# Patient Record
Sex: FEMALE | Race: WHITE | Employment: FULL TIME | URBAN - METROPOLITAN AREA
[De-identification: names, ages, dates, MRNs, and addresses within clinical notes are randomized per-mention and may not be internally consistent; named-entity substitution may affect disease eponyms.]

---

## 2017-02-01 PROCEDURE — 87624 HPV HI-RISK TYP POOLED RSLT: CPT | Performed by: INTERNAL MEDICINE

## 2017-02-01 PROCEDURE — 88175 CYTOPATH C/V AUTO FLUID REDO: CPT | Performed by: INTERNAL MEDICINE

## 2017-06-22 PROBLEM — M22.41 CHONDROMALACIA OF PATELLA, RIGHT: Status: ACTIVE | Noted: 2017-06-22

## 2025-01-14 ENCOUNTER — TELEPHONE (OUTPATIENT)
Dept: UROLOGY | Facility: CLINIC | Age: 55
End: 2025-01-14

## 2025-01-21 ENCOUNTER — OFFICE VISIT (OUTPATIENT)
Dept: UROLOGY | Facility: CLINIC | Age: 55
End: 2025-01-21
Attending: OBSTETRICS & GYNECOLOGY
Payer: COMMERCIAL

## 2025-01-21 VITALS
DIASTOLIC BLOOD PRESSURE: 80 MMHG | HEIGHT: 63 IN | BODY MASS INDEX: 29.23 KG/M2 | WEIGHT: 165 LBS | SYSTOLIC BLOOD PRESSURE: 120 MMHG

## 2025-01-21 DIAGNOSIS — N81.11 CYSTOCELE, MIDLINE: ICD-10-CM

## 2025-01-21 DIAGNOSIS — N81.6 RECTOCELE: ICD-10-CM

## 2025-01-21 DIAGNOSIS — N95.2 VAGINAL ATROPHY: ICD-10-CM

## 2025-01-21 DIAGNOSIS — N81.84 PELVIC MUSCLE WASTING: ICD-10-CM

## 2025-01-21 DIAGNOSIS — N39.41 URGE INCONTINENCE: ICD-10-CM

## 2025-01-21 DIAGNOSIS — N39.3 FEMALE STRESS INCONTINENCE: Primary | ICD-10-CM

## 2025-01-21 LAB
BILIRUB UR QL STRIP.AUTO: NEGATIVE
CLARITY UR REFRACT.AUTO: CLEAR
CONTROL RUN WITHIN 24 HOURS?: YES
GLUCOSE UR STRIP.AUTO-MCNC: NORMAL MG/DL
HYALINE CASTS #/AREA URNS AUTO: PRESENT /LPF
KETONES UR STRIP.AUTO-MCNC: NEGATIVE MG/DL
LEUKOCYTE ESTERASE UR QL STRIP.AUTO: NEGATIVE
LEUKOCYTE ESTERASE URINE: NEGATIVE
NITRITE URINE: NEGATIVE
PH UR STRIP.AUTO: 5.5 [PH] (ref 5–8)
PROT UR STRIP.AUTO-MCNC: NEGATIVE MG/DL
RBC UR QL AUTO: NEGATIVE
SP GR UR STRIP.AUTO: 1.03 (ref 1–1.03)
UROBILINOGEN UR STRIP.AUTO-MCNC: NORMAL MG/DL

## 2025-01-21 PROCEDURE — 99212 OFFICE O/P EST SF 10 MIN: CPT

## 2025-01-21 PROCEDURE — 87186 SC STD MICRODIL/AGAR DIL: CPT | Performed by: OBSTETRICS & GYNECOLOGY

## 2025-01-21 PROCEDURE — 87088 URINE BACTERIA CULTURE: CPT | Performed by: OBSTETRICS & GYNECOLOGY

## 2025-01-21 PROCEDURE — 87086 URINE CULTURE/COLONY COUNT: CPT | Performed by: OBSTETRICS & GYNECOLOGY

## 2025-01-21 PROCEDURE — 57160 INSERT PESSARY/OTHER DEVICE: CPT | Performed by: OBSTETRICS & GYNECOLOGY

## 2025-01-21 PROCEDURE — 81002 URINALYSIS NONAUTO W/O SCOPE: CPT | Performed by: OBSTETRICS & GYNECOLOGY

## 2025-01-21 PROCEDURE — 81001 URINALYSIS AUTO W/SCOPE: CPT | Performed by: OBSTETRICS & GYNECOLOGY

## 2025-01-21 RX ORDER — ESTRADIOL 0.1 MG/G
CREAM VAGINAL
Qty: 42 G | Refills: 3 | Status: SHIPPED | OUTPATIENT
Start: 2025-01-21

## 2025-01-21 RX ORDER — TOPIRAMATE 25 MG/1
25 TABLET, FILM COATED ORAL 2 TIMES DAILY
COMMUNITY

## 2025-01-21 RX ORDER — SEGESTERONE ACETATE AND ETHINYL ESTRADIOL 103; 17.4 MG/1; MG/1
RING VAGINAL
COMMUNITY

## 2025-01-21 NOTE — PROGRESS NOTES
Isabel Vu,   2025     Referred by self  Pt here with self    Chief Complaint   Patient presents with    Prolapse     Self-Referred       HPI:  +KATHERIN  +UUI  Nocturia x0  Denies sense of incomplete bladder emptying  +prolapse  Ring hormones, no cycles  No dyspareunia  Reg bowels    PRIOR TREATMENTS:    Kegels    no UTIs (3/23 & )  No gross hematuria    , vdx1, c/sx1    Reports h/o pyelo    Vitals:  /80   Ht 63\"   Wt 165 lb (74.8 kg)   BMI 29.23 kg/m²      HISTORY:  Past Medical History:    Chondromalacia of patella, right    Echo  - nl    Family history of colon cancer - mOTHER    Family history of early CAD - father, brother - MI    HYPERLIPIDEMIA    PIH (pregnancy induced hypertension)    Pyelonephritis      Past Surgical History:   Procedure Laterality Date          Colonoscopy  2016    internal hemorrhoids, repeat     Colonoscopy  2021    int hem, rpt     Colonoscopy,diagnostic  6/10/2011    HÉCTOR Balderas. Hemorrhoids, Repeat .     Colonoscopy,diagnostic N/A 2016    Procedure: COLONOSCOPY, POSSIBLE BIOPSY, POSSIBLE POLYPECTOMY 71195;  Surgeon: Jessenia Neri MD;  Location: [facility], [facility]    Other surgical history      Cervical lymph node excision      Family History   Problem Relation Age of Onset    Heart Disorder Father         MI age 48    Other (Other) Father     Cancer Mother         Rectal    Heart Disorder Brother         MI age 38    Cancer Brother         Bladder    Other (Other) Brother     Heart Disease Brother     Breast Cancer Maternal Grandmother 60        Age at dx 60    Breast Cancer Maternal Aunt 60        Age at dx 60    Cancer Brother         Bladder    Osteoporosis Sister       Social History     Socioeconomic History    Marital status:    Tobacco Use    Smoking status: Never    Smokeless tobacco: Never   Vaping Use    Vaping status: Never Used   Substance and Sexual Activity    Alcohol use: Yes     Alcohol/week:  1.0 - 2.0 standard drink of alcohol     Types: 1 - 2 Standard drinks or equivalent per week     Comment: monthly    Drug use: No    Sexual activity: Yes     Partners: Male   Social History Narrative        MD - OB/GYNE    3 CHILDREN - boys        Colon 6/16        Allergies:  Allergies[1]    Medications:  Medications Prior to Visit[2]    Urogynecology Summary:  Urogynecology Summary  Prolapse: Yes  KATHERIN: Yes  Urge Incontinence: Yes  Nocturia Frequency: 0  Frequency: 2 - 3 hours  Incomplete emptying: No  Constipation: No  Wears pad day?: 1  Activities are limited by UI/POP?: No  Currently Sexually Active: Yes    Review of Systems:    A comprehensive 12 point review of systems was completed.  Pertinent positives noted in the the HPI.  No CP  No SOB    GENERAL EXAM:  GENERAL:  Alert and Oriented, and NAD  HEENT:  Normal, no lesions  LUNGS:  Normal effort  HEART:  RRR  ABDOMEN: soft, no mass, no hernia  EXTREM:  Normal, no edema  SKIN:  Normal, no lesions    PELVIC EXAM:  Ext. Gen: early atrophy, no lesions  Urethra: early atrophy, nontender  Bladder:some fullness, nontender  Vagina: early atrophy, vag ring  Cervix: no bleeding, no lesions, nontender  Uterus: +mobile  Adnexa:no masses, nontender  Perineum: nontender  Anus: wnl  Rectum: defer    PELVIS FLOOR NEUROMUSCULAR FUNCTION:  Strength:  1 and Unable to hold greater than 3 sec  Perineal Sensation:  Normal      PELVIC SUPPORT:  [place]:  1  Ant:  2  Post:  2  CST:  positive  UVJ: +hypermobile    Pt examined with chaperone, RN    #3 ring with support with knob pessary placed, neg CST  Voided in BR  Pt instructed insertion/removal    Impression/Plan:    ICD-10-CM    1. Female stress incontinence  N39.3       2. Urge incontinence  N39.41       3. Pelvic muscle wasting  N81.84       4. Vaginal atrophy  N95.2 estradiol (ESTRACE) 0.1 MG/GM Vaginal Cream      5. Cystocele, midline  N81.11       6. Rectocele  N81.6           Discussion Items:   Urodynamics and cystoscopy  for evaluation of LUTS  Behavioral and pharmacologic treatments for OAB  Nonsurgical and surgical treatments for Stress Urinary Incontinence  Nonsurgical and surgical treatments for POP  Pelvic muscle rehabilitation including pelvic floor PT  Topical estrogen therapy for treating UGA  Discussed dietary and behavioral modification, discussed pharmacologic and nonpharmacologic mgmt options for urinary symptoms.     Discussed management of pelvic organ prolapse including but not limited to behavioral modifications, conservative options, and surgical management.   Discussed pessary management including benefits and risks. Discussed importance of keeping regularly scheduled pessary checks in prevention of complications related to pessary use.     Discussed mgmt of vulvovaginal atrophy with vaginal estrogen cream. Reviewed associated benefits, risks, alternatives, and goals. Recommend low dose twice weekly mgmt       Diagnostic Items:  Urine testing    Medications Discussed:  Estrace Cream    Treatment Plan, Non-surgical:   RN teaching/pt education done  Pessary  Estrace / Premarin cream    Treatment Plan, Surgical: mentioned  Mid-urethral slings (Trans Vaginal Taping, TOT, single-incision)    Vag estrogen  Pessary home care  PFPT prn, home exercises    Pt verbalizes understanding of all above discussed information. All questions answered. She agrees to plan    Return if symptoms worsen or fail to improve.    Isabel Vu, DO, FACOG, FACS      Discussion undertaken in English, info provided      The 21st Century Cures Act makes medical notes like these available to patients in the interest of transparency. However, be advised this is a medical document. It is intended as peer to peer communication. It is written in medical language and may contain abbreviations or verbiage that are unfamiliar. It may appear blunt or direct. Medical documents are intended to carry relevant information, facts as evident, and the clinical  opinion of the practitioner.          [1]   Allergies  Allergen Reactions    Sulfa Antibiotics RASH    Welchol [Colesevelam Hydrochloride]      CONSTIPATION     [2]   Outpatient Medications Prior to Visit   Medication Sig Dispense Refill    Segesterone-Ethinyl Estradiol (ANNOVERA) 0.15-0.013 MG/24HR Vaginal Ring Place vaginally.      metFORMIN HCl 1000 MG Oral Tab Take 1 tablet (1,000 mg total) by mouth daily with dinner.      topiramate 25 MG Oral Tab Take 1 tablet (25 mg total) by mouth 2 (two) times daily.      NUVARING 0.12-0.015 MG/24HR Vaginal Ring INSERT 1 RING VAGINALLY EVERY MONTH FOR 3 WEEKS AS DIRECTED (Patient not taking: Reported on 1/21/2025) 3 ring 4     No facility-administered medications prior to visit.

## 2025-01-24 ENCOUNTER — TELEPHONE (OUTPATIENT)
Dept: UROLOGY | Facility: CLINIC | Age: 55
End: 2025-01-24

## 2025-01-24 RX ORDER — NITROFURANTOIN 25; 75 MG/1; MG/1
100 CAPSULE ORAL 2 TIMES DAILY
Qty: 14 CAPSULE | Refills: 0 | Status: SHIPPED | OUTPATIENT
Start: 2025-01-24 | End: 2025-01-31

## 2025-01-24 RX ORDER — NITROFURANTOIN 25; 75 MG/1; MG/1
100 CAPSULE ORAL 2 TIMES DAILY
Qty: 14 CAPSULE | Refills: 0 | Status: SHIPPED | OUTPATIENT
Start: 2025-01-24 | End: 2025-01-24

## 2025-01-24 NOTE — TELEPHONE ENCOUNTER
Incoming call from pt with pharmacy info she'd like Rx to be sent to. Prescription sent electronically

## 2025-01-24 NOTE — TELEPHONE ENCOUNTER
Mildred Daily reviewed + urine culture result  TORB +ucx, please prescribe macrobid 100 mg bid for 7 days   Called pt and notified of urine cx result and new orders.  Pt leaving out of town for the weekend today.  Will call our office back if she is unable to  RX before she leaves so we can call RX to travel location.  Answered all questions, pt verbalizes understanding.